# Patient Record
Sex: MALE | Race: WHITE | Employment: OTHER | URBAN - METROPOLITAN AREA
[De-identification: names, ages, dates, MRNs, and addresses within clinical notes are randomized per-mention and may not be internally consistent; named-entity substitution may affect disease eponyms.]

---

## 2019-07-04 ENCOUNTER — APPOINTMENT (OUTPATIENT)
Dept: GENERAL RADIOLOGY | Age: 69
End: 2019-07-04
Payer: MEDICARE

## 2019-07-04 ENCOUNTER — HOSPITAL ENCOUNTER (EMERGENCY)
Age: 69
Discharge: HOME | End: 2019-07-04
Attending: EMERGENCY MEDICINE
Payer: MEDICARE

## 2019-07-04 VITALS
BODY MASS INDEX: 31.71 KG/M2 | OXYGEN SATURATION: 97 % | TEMPERATURE: 97.8 F | DIASTOLIC BLOOD PRESSURE: 58 MMHG | HEIGHT: 67 IN | RESPIRATION RATE: 18 BRPM | HEART RATE: 87 BPM | WEIGHT: 202 LBS | SYSTOLIC BLOOD PRESSURE: 149 MMHG

## 2019-07-04 DIAGNOSIS — S61.012A LACERATION OF LEFT THUMB WITHOUT FOREIGN BODY WITHOUT DAMAGE TO NAIL, INITIAL ENCOUNTER: ICD-10-CM

## 2019-07-04 DIAGNOSIS — S69.92XA INJURY OF LEFT THUMB, INITIAL ENCOUNTER: Primary | ICD-10-CM

## 2019-07-04 PROCEDURE — 73130 X-RAY EXAM OF HAND: CPT

## 2019-07-04 PROCEDURE — 99283 EMERGENCY DEPT VISIT LOW MDM: CPT

## 2019-07-04 PROCEDURE — 6360000002 HC RX W HCPCS: Performed by: NURSE PRACTITIONER

## 2019-07-04 PROCEDURE — 12001 RPR S/N/AX/GEN/TRNK 2.5CM/<: CPT

## 2019-07-04 PROCEDURE — 90715 TDAP VACCINE 7 YRS/> IM: CPT | Performed by: NURSE PRACTITIONER

## 2019-07-04 PROCEDURE — 90471 IMMUNIZATION ADMIN: CPT | Performed by: NURSE PRACTITIONER

## 2019-07-04 RX ORDER — LIDOCAINE HYDROCHLORIDE 10 MG/ML
5 INJECTION, SOLUTION INFILTRATION; PERINEURAL ONCE
Status: DISCONTINUED | OUTPATIENT
Start: 2019-07-04 | End: 2019-07-04 | Stop reason: HOSPADM

## 2019-07-04 RX ORDER — LIDOCAINE HYDROCHLORIDE 10 MG/ML
5 INJECTION, SOLUTION INFILTRATION; PERINEURAL ONCE
Status: DISCONTINUED | OUTPATIENT
Start: 2019-07-04 | End: 2019-07-04

## 2019-07-04 RX ORDER — CITALOPRAM 20 MG/1
20 TABLET ORAL DAILY
COMMUNITY

## 2019-07-04 RX ORDER — LORAZEPAM 0.5 MG/1
0.5 TABLET ORAL EVERY 6 HOURS PRN
COMMUNITY

## 2019-07-04 RX ORDER — SIMVASTATIN 10 MG
10 TABLET ORAL NIGHTLY
COMMUNITY

## 2019-07-04 RX ADMIN — TETANUS TOXOID, REDUCED DIPHTHERIA TOXOID AND ACELLULAR PERTUSSIS VACCINE, ADSORBED 0.5 ML: 5; 2.5; 8; 8; 2.5 SUSPENSION INTRAMUSCULAR at 16:39

## 2019-07-04 SDOH — HEALTH STABILITY: MENTAL HEALTH: HOW OFTEN DO YOU HAVE A DRINK CONTAINING ALCOHOL?: NEVER

## 2019-07-04 ASSESSMENT — PAIN SCALES - GENERAL: PAINLEVEL_OUTOF10: 4

## 2019-07-04 ASSESSMENT — PAIN DESCRIPTION - ORIENTATION: ORIENTATION: LEFT

## 2019-07-04 ASSESSMENT — ENCOUNTER SYMPTOMS: COLOR CHANGE: 0

## 2019-07-04 ASSESSMENT — PAIN DESCRIPTION - DESCRIPTORS: DESCRIPTORS: TENDER;THROBBING

## 2019-07-04 ASSESSMENT — PAIN DESCRIPTION - PAIN TYPE: TYPE: ACUTE PAIN

## 2019-07-04 ASSESSMENT — PAIN DESCRIPTION - LOCATION: LOCATION: FINGER (COMMENT WHICH ONE)

## 2019-07-04 NOTE — ED PROVIDER NOTES
Prescriptions    No medications on file           (Please note that portions of this note were completed with a voice recognition program.  Efforts were made to edit the dictations but occasionally words are mis-transcribed.)    PRINCE Fournier CNP, APRN - CNP  07/04/19 2156

## 2021-06-17 ENCOUNTER — OFFICE VISIT (OUTPATIENT)
Dept: PRIMARY CARE CLINIC | Age: 71
End: 2021-06-17
Payer: MEDICARE

## 2021-06-17 VITALS
TEMPERATURE: 97.9 F | SYSTOLIC BLOOD PRESSURE: 135 MMHG | DIASTOLIC BLOOD PRESSURE: 69 MMHG | BODY MASS INDEX: 29.82 KG/M2 | WEIGHT: 190 LBS | HEART RATE: 80 BPM | OXYGEN SATURATION: 95 % | HEIGHT: 67 IN

## 2021-06-17 DIAGNOSIS — S61.042A: Primary | ICD-10-CM

## 2021-06-17 PROCEDURE — 10120 INC&RMVL FB SUBQ TISS SMPL: CPT | Performed by: NURSE PRACTITIONER

## 2021-06-17 PROCEDURE — 4040F PNEUMOC VAC/ADMIN/RCVD: CPT | Performed by: NURSE PRACTITIONER

## 2021-06-17 PROCEDURE — 1123F ACP DISCUSS/DSCN MKR DOCD: CPT | Performed by: NURSE PRACTITIONER

## 2021-06-17 PROCEDURE — 99203 OFFICE O/P NEW LOW 30 MIN: CPT | Performed by: NURSE PRACTITIONER

## 2021-06-17 PROCEDURE — 1036F TOBACCO NON-USER: CPT | Performed by: NURSE PRACTITIONER

## 2021-06-17 PROCEDURE — G8417 CALC BMI ABV UP PARAM F/U: HCPCS | Performed by: NURSE PRACTITIONER

## 2021-06-17 PROCEDURE — 3017F COLORECTAL CA SCREEN DOC REV: CPT | Performed by: NURSE PRACTITIONER

## 2021-06-17 PROCEDURE — G8427 DOCREV CUR MEDS BY ELIG CLIN: HCPCS | Performed by: NURSE PRACTITIONER

## 2021-06-17 RX ORDER — MELOXICAM 15 MG/1
TABLET ORAL
COMMUNITY

## 2021-06-17 RX ORDER — MECLIZINE HYDROCHLORIDE 25 MG/1
25 TABLET ORAL 3 TIMES DAILY PRN
COMMUNITY

## 2021-06-17 RX ORDER — SULFAMETHOXAZOLE AND TRIMETHOPRIM 800; 160 MG/1; MG/1
1 TABLET ORAL 2 TIMES DAILY
Qty: 14 TABLET | Refills: 0 | Status: SHIPPED | OUTPATIENT
Start: 2021-06-17 | End: 2021-06-24

## 2021-06-17 RX ORDER — ROSUVASTATIN CALCIUM 10 MG/1
10 TABLET, COATED ORAL DAILY
COMMUNITY

## 2021-06-17 RX ORDER — MUPIROCIN CALCIUM 20 MG/G
CREAM TOPICAL
Qty: 30 G | Refills: 0 | Status: SHIPPED | OUTPATIENT
Start: 2021-06-17 | End: 2021-07-17

## 2021-06-17 RX ORDER — NEBIVOLOL 5 MG/1
TABLET ORAL EVERY 24 HOURS
COMMUNITY

## 2021-06-17 RX ORDER — GLIPIZIDE 5 MG/1
TABLET ORAL
COMMUNITY
Start: 2020-11-09

## 2021-06-17 ASSESSMENT — PATIENT HEALTH QUESTIONNAIRE - PHQ9
SUM OF ALL RESPONSES TO PHQ QUESTIONS 1-9: 0
SUM OF ALL RESPONSES TO PHQ QUESTIONS 1-9: 0
2. FEELING DOWN, DEPRESSED OR HOPELESS: 0
SUM OF ALL RESPONSES TO PHQ9 QUESTIONS 1 & 2: 0
1. LITTLE INTEREST OR PLEASURE IN DOING THINGS: 0
SUM OF ALL RESPONSES TO PHQ QUESTIONS 1-9: 0

## 2021-06-17 ASSESSMENT — ENCOUNTER SYMPTOMS
NAUSEA: 0
SORE THROAT: 0
COUGH: 0
SHORTNESS OF BREATH: 0
DIARRHEA: 0
SINUS PAIN: 0
EYE PAIN: 0
VOMITING: 0
BACK PAIN: 0
ABDOMINAL PAIN: 0

## 2021-06-17 NOTE — PROGRESS NOTES
[facility] [id number] [facility] [place] [facility]  [address] [address]  Dept: [phone]  Dept Fax: [phone]    Millie Crain is a 66 y.o. male who presents today for his medicalconditions/complaints as noted below. Millie Crain is c/o of Other (fish hook in lt thumb x today)      HPI:     53-year-old male patient presents with complaint of foreign body to left thumb. Patient reports he was fishing earlier today when he accidentally got a fishhook stuck in the palmar aspect of the left thumb. Denies significant pain. Range of motion and use of the thumb. Reports up-to-date on tetanus. Past Medical History:   Diagnosis Date    Diabetes mellitus ([facility] [address]     Hyperlipidemia         Current Outpatient Medications   Medication Sig Dispense Refill    Multiple Vitamin (MULTIVITAMIN ADULT PO) multivitamin      glipiZIDE (GLUCOTROL) 5 MG tablet TAKE ONE TABLET BY MOUTH TWICE DAILY 30 MINUTES BEFORE BREAKFAST AND DINNER FOR DIABETES      meloxicam (MOBIC) 15 MG tablet meloxicam 15 mg tablet      nebivolol (BYSTOLIC) 5 MG tablet every 24 hours      SITagliptin (JANUVIA) 100 MG tablet every 24 hours      rosuvastatin (CRESTOR) 10 MG tablet Take 10 mg by mouth daily      doxyLAMINE succinate (UNISOM SLEEPTABS) 25 MG tablet Take by mouth nightly      meclizine (ANTIVERT) 25 MG tablet Take 25 mg by mouth 3 times daily as needed      empagliflozin (JARDIANCE) 25 MG tablet Take 25 mg by mouth daily      sulfamethoxazole-trimethoprim (BACTRIM DS;SEPTRA DS) 800-160 MG per tablet Take 1 tablet by mouth 2 times daily for 7 days 14 tablet 0    mupirocin (BACTROBAN) 2 % cream Apply 3 times daily.  30 g 0    citalopram (CELEXA) 20 MG tablet Take 20 mg by mouth daily (Patient not taking: Reported on 6/17/2021)      simvastatin (ZOCOR) 10 MG tablet Take 10 mg by mouth nightly (Patient not taking: Reported on 6/17/2021)      LORazepam (ATIVAN) 0.5 MG tablet Take 0.5 mg by mouth every 6 hours as needed for Anxiety. (Patient not taking: Reported on 6/17/2021)       No current facility-administered medications for this visit. Allergies   Allergen Reactions    Ibuprofen     Penicillin G Rash     Other reaction(s): Nasal mucosa dry, Eruption       Subjective:      Review of Systems   Constitutional: Negative for chills and fatigue. HENT: Negative for congestion, ear pain, sinus pain and sore throat. Eyes: Negative for pain and visual disturbance. Respiratory: Negative for cough and shortness of breath. Cardiovascular: Negative for chest pain and palpitations. Gastrointestinal: Negative for abdominal pain, diarrhea, nausea and vomiting. Genitourinary: Negative for penile pain and testicular pain. Musculoskeletal: Negative for back pain, joint swelling and neck pain. Skin: Positive for wound. Negative for rash. Neurological: Negative for dizziness and light-headedness. Hematological: Does not bruise/bleed easily. All other systems reviewed and are negative.      :Objective     Physical Exam  Vitals and nursing note reviewed. Constitutional:       General: He is not in acute distress. Appearance: Normal appearance. He is not toxic-appearing. HENT:      Mouth/Throat:      Mouth: Mucous membranes are moist.   Cardiovascular:      Rate and Rhythm: Normal rate. Pulmonary:      Effort: Pulmonary effort is normal. No respiratory distress. Breath sounds: Normal breath sounds. Musculoskeletal:        Arms:    Skin:     General: Skin is warm and dry. Neurological:      General: No focal deficit present. Mental Status: He is alert and oriented to person, place, and time. /69 (Site: Right Upper Arm, Position: Sitting, Cuff Size: Medium Adult)   Pulse 80   Temp 97.9 °F (36.6 °C) (Temporal)   Ht 5' 7\" (1.702 m)   Wt 190 lb (86.2 kg)   SpO2 95%   BMI 29.76 kg/m²     Lab Review   No visits with results within 2 Month(s) from this visit.    Latest known visit with results is:   Hospital Outpatient Visit on 09/21/2013   Component Date Value    Albumin 09/21/2013 4.3     Alkaline Phosphatase 09/21/2013 86     ALT 09/21/2013 24     AST 09/21/2013 20     Total Bilirubin 09/21/2013 0.46     Bilirubin, Direct 09/21/2013 0.14     Bilirubin, Indirect 09/21/2013 0.32     Total Protein 09/21/2013 7.0     Globulin 09/21/2013 NOT REPORTED     Albumin/Globulin Ratio 09/21/2013 1.6     Glucose 09/21/2013 138*    BUN 09/21/2013 17     CREATININE 09/21/2013 0.89     Bun/Cre Ratio 09/21/2013 NOT REPORTED     Calcium 09/21/2013 9.5     Sodium 09/21/2013 141     Potassium 09/21/2013 5.1     Chloride 09/21/2013 108     CO2 09/21/2013 28     Anion Gap 09/21/2013 11     GFR Non- 09/21/2013 >60     GFR  09/21/2013 >60     GFR Comment 09/21/2013          GFR Staging 09/21/2013 NOT REPORTED     Cholesterol 09/21/2013 211*    HDL 09/21/2013 42     LDL Cholesterol 09/21/2013 142*    Chol/HDL Ratio 09/21/2013 5.0*    Triglycerides 09/21/2013 133     VLDL 09/21/2013 NOT REPORTED     Vit D, 25-Hydroxy 09/21/2013 47.0     Color, UA 09/21/2013 YELLOW     Turbidity UA 09/21/2013 CLEAR     Glucose, Ur 09/21/2013 NEGATIVE     Bilirubin Urine 09/21/2013 NEGATIVE     Ketones, Urine 09/21/2013 NEGATIVE     Specific [place], UA 09/21/2013 1.016     Urine Hgb 09/21/2013 NEGATIVE     pH, UA 09/21/2013 6.0     Protein, UA 09/21/2013 NEGATIVE     Urobilinogen, Urine 09/21/2013 Normal     Nitrite, Urine 09/21/2013 NEGATIVE     Leukocyte Esterase, Urine 09/21/2013 NEGATIVE     Urinalysis Comments 09/21/2013 Microscopic exam not performed based on chemical results unless requested in     Microalb, Ur 09/21/2013 <6     Creatinine, Ur 09/21/2013 70.0     Microalb/Crt.  Ratio 09/21/2013 9     Urine Microalbumin Interp 09/21/2013          Hemoglobin A1C 09/21/2013 6.2*    Estimated Avg Glucose 09/21/2013 131        Assessment and Plan      1. Puncture wound of left thumb with foreign body without damage to nail, initial encounter  -     sulfamethoxazole-trimethoprim (BACTRIM DS;SEPTRA DS) 800-160 MG per tablet; Take 1 tablet by mouth 2 times daily for 7 days, Disp-14 tablet, R-0Normal  -     mupirocin (BACTROBAN) 2 % cream; Apply 3 times daily. , Disp-30 g, R-0, Normal  -     XR HAND LEFT (MIN 3 VIEWS); Future         Area was cleaned, local anesthetic applied, a fishhook was then backed out and removed  Placed on topical and oral antibiotics per  Order for x-ray to complete if pain worsens  Return as needed, follow-up for wound check in 2 to 3 days. No results found for this visit on 06/17/21. Return if symptoms worsen or fail to improve. Orders Placed This Encounter   Medications    sulfamethoxazole-trimethoprim (BACTRIM DS;SEPTRA DS) 800-160 MG per tablet     Sig: Take 1 tablet by mouth 2 times daily for 7 days     Dispense:  14 tablet     Refill:  0    mupirocin (BACTROBAN) 2 % cream     Sig: Apply 3 times daily. Dispense:  30 g     Refill:  0        Patient given educational materials - see patient instructions. Discussed use, benefit, and side effects of prescribed medications. All patientquestions answered. Pt voiced understanding. Patient given educational materials - see patient instructions. Discussed use, benefit, and side effects of prescribed medications. All patientquestions answered. Pt voiced understanding. This note was transcribed using dictation with Dragon services. Efforts were made to correct any errors but some words may be misinterpreted.     Electronically signed by PRINCE Lopez CNP on 6/17/2021at 6:37 PM

## 2021-06-17 NOTE — PATIENT INSTRUCTIONS
Patient Education        Puncture Wounds: Care Instructions  Your Care Instructions     A puncture wound can happen anywhere on your body. These wounds tend to be narrower and deeper than cuts. A puncture wound is usually left open instead of being closed. This is because a puncture wound can be easily infected, and closing it can make infection even more likely. You will probably have a bandage over the wound. The doctor has checked you carefully, but problems can develop later. If you notice any problems or new symptoms, get medical treatment right away. Follow-up care is a key part of your treatment and safety. Be sure to make and go to all appointments, and call your doctor if you are having problems. It's also a good idea to know your test results and keep a list of the medicines you take. How can you care for yourself at home? · Keep the wound dry for the first 24 to 48 hours. After this, you can shower if your doctor okays it. Pat the wound dry. · Don't soak the wound, such as in a bathtub. Your doctor will tell you when it's safe to get the wound wet. · If your doctor told you how to care for your wound, follow your doctor's instructions. If you did not get instructions, follow this general advice:  ? After the first 24 to 48 hours, wash the wound with clean water 2 times a day. Don't use hydrogen peroxide or alcohol, which can slow healing. ? You may cover the wound with a thin layer of petroleum jelly, such as Vaseline, and a nonstick bandage. ? Apply more petroleum jelly and replace the bandage as needed. · Prop up the sore area on pillows anytime you sit or lie down during the next 3 days. Try to keep it above the level of your heart. This helps reduce swelling. · Avoid any activity that could cause your wound to get worse. · Be safe with medicines. Read and follow all instructions on the label. ? If the doctor gave you a prescription medicine for pain, take it as prescribed.   ? If you

## 2025-03-14 ENCOUNTER — RESULTS FOLLOW-UP (OUTPATIENT)
Dept: PRIMARY CARE CLINIC | Age: 75
End: 2025-03-14

## 2025-03-14 ENCOUNTER — OFFICE VISIT (OUTPATIENT)
Dept: PRIMARY CARE CLINIC | Age: 75
End: 2025-03-14
Payer: MEDICARE

## 2025-03-14 VITALS
OXYGEN SATURATION: 97 % | DIASTOLIC BLOOD PRESSURE: 77 MMHG | TEMPERATURE: 98.4 F | HEART RATE: 76 BPM | SYSTOLIC BLOOD PRESSURE: 152 MMHG

## 2025-03-14 DIAGNOSIS — M54.6 ACUTE LEFT-SIDED THORACIC BACK PAIN: Primary | ICD-10-CM

## 2025-03-14 PROCEDURE — 1123F ACP DISCUSS/DSCN MKR DOCD: CPT

## 2025-03-14 PROCEDURE — 99213 OFFICE O/P EST LOW 20 MIN: CPT

## 2025-03-14 PROCEDURE — G8421 BMI NOT CALCULATED: HCPCS

## 2025-03-14 PROCEDURE — G8427 DOCREV CUR MEDS BY ELIG CLIN: HCPCS

## 2025-03-14 PROCEDURE — 1160F RVW MEDS BY RX/DR IN RCRD: CPT

## 2025-03-14 PROCEDURE — 1159F MED LIST DOCD IN RCRD: CPT

## 2025-03-14 PROCEDURE — 1036F TOBACCO NON-USER: CPT

## 2025-03-14 RX ORDER — METFORMIN HYDROCHLORIDE 500 MG/1
500 TABLET, EXTENDED RELEASE ORAL
COMMUNITY
Start: 2024-04-05

## 2025-03-14 RX ORDER — BACLOFEN 10 MG/1
10 TABLET ORAL 3 TIMES DAILY
Qty: 30 TABLET | Refills: 0 | Status: SHIPPED | OUTPATIENT
Start: 2025-03-14 | End: 2025-03-24

## 2025-03-14 RX ORDER — PREDNISONE 20 MG/1
40 TABLET ORAL DAILY
Qty: 10 TABLET | Refills: 0 | Status: SHIPPED | OUTPATIENT
Start: 2025-03-14 | End: 2025-03-19

## 2025-03-14 ASSESSMENT — ENCOUNTER SYMPTOMS
ANAL BLEEDING: 0
EYE PAIN: 0
EYE REDNESS: 0
SORE THROAT: 0
BACK PAIN: 1
COLOR CHANGE: 0
TROUBLE SWALLOWING: 0
EYES NEGATIVE: 1
PHOTOPHOBIA: 0
SHORTNESS OF BREATH: 0
CONSTIPATION: 0
SINUS PAIN: 0
SINUS PRESSURE: 0
RECTAL PAIN: 0
DIARRHEA: 0
BLOOD IN STOOL: 0
RESPIRATORY NEGATIVE: 1
APNEA: 0
FACIAL SWELLING: 0
ABDOMINAL PAIN: 0
COUGH: 0
WHEEZING: 0
RHINORRHEA: 0
ABDOMINAL DISTENTION: 0
BOWEL INCONTINENCE: 0
CHOKING: 0
GASTROINTESTINAL NEGATIVE: 1
EYE DISCHARGE: 0
VOICE CHANGE: 0
NAUSEA: 0
VOMITING: 0
STRIDOR: 0
EYE ITCHING: 0
CHEST TIGHTNESS: 0

## 2025-03-14 NOTE — PROGRESS NOTES
to person, place, and time.   Psychiatric:         Mood and Affect: Mood normal.         Behavior: Behavior normal.         Plan:     Assessment & Plan     1. Acute left-sided thoracic back pain  -     XR THORACIC SPINE (3 VIEWS); Future     Xray Result (most recent):  XR THORACIC SPINE (3 VIEWS) 03/14/2025    Narrative  EXAMINATION:  THREE XRAY VIEWS OF THE THORACIC SPINE    3/14/2025 1:43 pm    COMPARISON:  None.    HISTORY:  ORDERING SYSTEM PROVIDED HISTORY: Acute left-sided thoracic back pain  TECHNOLOGIST PROVIDED HISTORY:  right t spine pain X 1 month  Reason for Exam: rt sided t-spine pain    FINDINGS:  Vertebral body heights are maintained.  Multilevel anterior hypertrophic  changes in the mid and lower thoracic spine are noted along with mild disc  space narrowing.  Lung fields are clear.    Impression  Mild degenerative changes in the mid and lower thoracic spine.    -Discussed CNS effects of baclofen.  Do not drive, operate heavy machinery with this medication.  -Continue over-the-counter medications as needed for symptoms such as Tylenol/Motrin, prn for pain  -Go to the ER for shortness of breath, chest pain.    -F/u with pcp if symptoms persisting  -Patient verbalized understanding.    Follow Up Instructions:      Return if symptoms worsen or fail to improve.    Orders Placed This Encounter   Medications    baclofen (LIORESAL) 10 MG tablet     Sig: Take 1 tablet by mouth 3 times daily for 10 days     Dispense:  30 tablet     Refill:  0    predniSONE (DELTASONE) 20 MG tablet     Sig: Take 2 tablets by mouth daily for 5 days     Dispense:  10 tablet     Refill:  0             Patient and/or parent given educational materials - see patient instructions.  Discussed use, benefit, and side effects of prescribed medications.  All patient questions answered.  Patient and/or parent voiced understanding.      Electronically signed by PRINCE Bourne 3/14/2025 at 6:35 PM